# Patient Record
Sex: MALE | Race: OTHER | URBAN - METROPOLITAN AREA
[De-identification: names, ages, dates, MRNs, and addresses within clinical notes are randomized per-mention and may not be internally consistent; named-entity substitution may affect disease eponyms.]

---

## 2017-09-29 ENCOUNTER — OFFICE VISIT (OUTPATIENT)
Dept: SLEEP MEDICINE | Age: 52
End: 2017-09-29

## 2017-09-29 VITALS
HEART RATE: 55 BPM | WEIGHT: 219.5 LBS | SYSTOLIC BLOOD PRESSURE: 150 MMHG | DIASTOLIC BLOOD PRESSURE: 83 MMHG | BODY MASS INDEX: 32.51 KG/M2 | OXYGEN SATURATION: 99 % | HEIGHT: 69 IN

## 2017-09-29 DIAGNOSIS — G47.33 OSA (OBSTRUCTIVE SLEEP APNEA): Primary | ICD-10-CM

## 2017-09-29 DIAGNOSIS — I10 ESSENTIAL HYPERTENSION: ICD-10-CM

## 2017-09-29 RX ORDER — FLUOXETINE 20 MG/1
20 TABLET ORAL DAILY
COMMUNITY

## 2017-09-29 RX ORDER — ATENOLOL AND CHLORTHALIDONE TABLET 100; 25 MG/1; MG/1
1 TABLET ORAL DAILY
COMMUNITY

## 2017-09-29 NOTE — PROGRESS NOTES
[address] [place]. [place], [address]  Tel.  [phone]  Fax. 100 [facility] 60  [place], [address] [facility]  Tel.  [phone]  Fax. [phone] [address]   Tel.  [phone]  Fax. [phone]         Subjective:      Anat Mcnulty is an 45 y.o. male referred for evaluation for a sleep disorder. He complains of snoring associated with snorting, periods of not breathing. Symptoms began 3 years ago, gradually worsening since that time. He usually can fall asleep in 5 minutes. Family or house members note snoring, periods of not breathing. He denies completely or partially paralyzed while falling asleep or waking up. Anat Mcnulty does wake up frequently at night. He is bothered by waking up too early and left unable to get back to sleep. He actually sleeps about 8 hours at night and wakes up about 3 times during the night. He does not work shifts:  .   Fidelia Otto indicates he   get too little sleep at night. His bedtime is 2200. He awakens at 0700. He does not take naps. He has the following observed behaviors: Loud snoring, Light snoring, Pauses in breathing, Sleep talking, Twitching of legs or feet;  . Other remarks: waking with a gasp or snort    [place] Sleepiness Score: 3     Not on File      Current Outpatient Prescriptions:     atenolol-chlorthalidone (TENORETIC) 100-25 mg per tablet, Take 1 Tab by mouth daily. , Disp: , Rfl:     FLUoxetine (PROZAC) 20 mg tablet, Take 20 mg by mouth daily. , Disp: , Rfl:      He  has a past medical history of Hypertension and Psychiatric disorder. He  has no past surgical history on file. He family history includes Cancer in his father and mother; Hypertension in his father and mother. He  reports that he has never smoked. He has never used smokeless tobacco. He reports that he drinks alcohol.      Review of Systems:  Constitutional:  No significant weight loss or weight gain  Eyes:  No blurred vision  CVS:  No significant chest pain  Pulm:  No significant shortness of breath  GI:  No significant nausea or vomiting  :  No significant nocturia  Musculoskeletal:  No significant joint pain at night  Skin:  No significant rashes  Neuro:  No significant dizziness   Psych:  No active mood issues    Sleep Review of Systems: notable for no difficulty falling asleep; frequent awakenings at night;  regular dreaming noted; nightmares ; no early morning headaches; no memory problems; no concentration issues; no history of any automobile or occupational accidents due to daytime drowsiness. Objective:     Visit Vitals    /83    Pulse (!) 55    Ht 5' 9\" (1.753 m)    Wt 219 lb 8 oz (99.6 kg)    SpO2 99%    BMI 32.41 kg/m2         General:   Not in acute distress   Eyes:  Anicteric sclerae, no obvious strabismus   Nose:  No obvious nasal septum deviation    Oropharynx:   Class 4 oropharyngeal outlet, thick tongue base, uvula could not be seen due to low-lying soft palate, narrow tonsilo-pharyngeal pilars   Tonsils:   tonsils are not seen due to low-lying soft palate   Neck:   Neck circ. in \"inches\": 16.5; midline trachea   Chest/Lungs:  Equal lung expansion, clear on auscultation    CVS:  Normal rate, regular rhythm; no JVD   Skin:  Warm to touch; no obvious rashes   Neuro:  No focal deficits ; no obvious tremor    Psych:  Normal affect,  normal countenance;          Assessment:       ICD-10-CM ICD-9-CM    1. CIERA (obstructive sleep apnea) G47.33 327.23 SLEEP STUDY UNATTENDED, 4 CHANNEL   2. Essential hypertension I10 401.9    3. BMI 32.0-32.9,adult Z68.32 V85.32          Plan:     * The patient currently has a Moderate Risk for having sleep apnea. STOP-BANG score 5.  * Sleep testing was ordered for initial evaluation. * He was provided information on sleep apnea including coresponding risk factors and the importance of proper treatment. * Treatment options if indicated were reviewed today.  Patient agrees to a trial of OAT (Juan Montiel, LEE) therapy if indicated. * Counseling was provided regarding proper sleep hygiene (including effect of light on sleep), paradoxical intention, stimulus control, sleep environment safety and safe driving. * Effect of sleep disturbance on weight was reviewed. We have recommended a dedicated weight loss through appropriate diet and an exercise regiment as significant weight reduction has been shown to reduce severity of obstructive sleep apnea. * Patient agrees to telephone follow-up by myself or lead sleep technologist shortly after sleep study to review results and plan final management.     (patient has given permission for a message to be left regarding test results and further management if patient cannot be cannot be reached directly). Thank you for allowing us to participate in your patient's medical care. We'll keep you updated on these investigations. Karlene Conway MD, FAASM  Electronically signed.  09/29/17

## 2017-09-29 NOTE — PATIENT INSTRUCTIONS
[address] [place]. [place], [address]  Tel.  [phone]  Fax. 100 [facility] 60  [place], [address] [facility]  Tel.  [phone]  Fax. [phone] [address]  Tel.  [phone]  Fax. [phone]     Sleep Apnea: After Your Visit  Your Care Instructions  Sleep apnea occurs when you frequently stop breathing for 10 seconds or longer during sleep. It can be mild to severe, based on the number of times per hour that you stop breathing or have slowed breathing. Blocked or narrowed airways in your nose, mouth, or throat can cause sleep apnea. Your airway can become blocked when your throat muscles and tongue relax during sleep. Sleep apnea is common, occurring in 1 out of 20 individuals. Individuals having any of the following characteristics should be evaluated and treated right away due to high risk and detrimental consequences from untreated sleep apnea:  1. Obesity  2. Congestive Heart failure  3. Atrial Fibrillation  4. Uncontrolled Hypertension  5. Type II Diabetes  6. Night-time Arrhythmias  7. Stroke  8. Pulmonary Hypertension  9. High-risk Driving Populations (pilots, truck drivers, etc.)  10. Patients Considering Weight-loss Surgery    How do you know you have sleep apnea? You probably have sleep apnea if you answer 'yes' to 3 or more of the following questions:  S - Have you been told that you Snore? T - Are you often Tired during the day? O - Has anyone Observed you stop breathing while sleeping? P- Do you have (or are being treated for) high blood Pressure? B - Are you obese (Body Mass Index > 35)? A - Is your Age 48years old or older? N - Is your Neck size greater than 16 inches? G - Are you male Gender? A sleep physician can prescribe a breathing device that prevents tissues in the throat from blocking your airway.  Or your doctor may recommend using a dental device (oral breathing device) to help keep your airway open. In some cases, surgery may be needed to remove enlarged tissues in the throat. Follow-up care is a key part of your treatment and safety. Be sure to make and go to all appointments, and call your doctor if you are having problems. It's also a good idea to know your test results and keep a list of the medicines you take. How can you care for yourself at home? · Lose weight, if needed. It may reduce the number of times you stop breathing or have slowed breathing. · Go to bed at the same time every night. · Sleep on your side. It may stop mild apnea. If you tend to roll onto your back, sew a pocket in the back of your pajama top. Put a tennis ball into the pocket, and stitch the pocket shut. This will help keep you from sleeping on your back. · Avoid alcohol and medicines such as sleeping pills and sedatives before bed. · Do not smoke. Smoking can make sleep apnea worse. If you need help quitting, talk to your doctor about stop-smoking programs and medicines. These can increase your chances of quitting for good. · Prop up the head of your bed 4 to 6 inches by putting bricks under the legs of the bed. · Treat breathing problems, such as a stuffy nose, caused by a cold or allergies. · Use a continuous positive airway pressure (CPAP) breathing machine if lifestyle changes do not help your apnea and your doctor recommends it. The machine keeps your airway from closing when you sleep. · If CPAP does not help you, ask your doctor whether you should try other breathing machines. A bilevel positive airway pressure machine has two types of air pressureâone for breathing in and one for breathing out. Another device raises or lowers air pressure as needed while you breathe. · If your nose feels dry or bleeds when using one of these machines, talk with your doctor about increasing moisture in the air. A humidifier may help.   · If your nose is runny or stuffy from using a breathing machine, talk with your doctor about using decongestants or a corticosteroid nasal spray. When should you call for help? Watch closely for changes in your health, and be sure to contact your doctor if:  · You still have sleep apnea even though you have made lifestyle changes. · You are thinking of trying a device such as CPAP. · You are having problems using a CPAP or similar machine. Where can you learn more? Go to [employer]. Enter M203 in the search box to learn more about \"Sleep Apnea: After Your Visit. \"   © 7014-6950 Healthwise, Incorporated. Care instructions adapted under license by [facility], [facility] [employer] (which disclaims liability or warranty for this information). This care instruction is for use with your licensed healthcare professional. If you have questions about a medical condition or this instruction, always ask your healthcare professional. [place] any warranty or liability for your use of this information. PROPER SLEEP HYGIENE    What to avoid  · Do not have drinks with caffeine, such as coffee or black tea, for 8 hours before bed. · Do not smoke or use other types of tobacco near bedtime. Nicotine is a stimulant and can keep you awake. · Avoid drinking alcohol late in the evening, because it can cause you to wake in the middle of the night. · Do not eat a big meal close to bedtime. If you are hungry, eat a light snack. · Do not drink a lot of water close to bedtime, because the need to urinate may wake you up during the night. · Do not read or watch TV in bed. Use the bed only for sleeping and sexual activity. What to try  · Go to bed at the same time every night, and wake up at the same time every morning. Do not take naps during the day. · Keep your bedroom quiet, dark, and cool. · Get regular exercise, but not within 3 to 4 hours of your bedtime. .  · Sleep on a comfortable pillow and mattress.   · If watching the clock makes you anxious, turn it facing away from you so you cannot see the time. · If you worry when you lie down, start a worry book. Well before bedtime, write down your worries, and then set the book and your concerns aside. · Try meditation or other relaxation techniques before you go to bed. · If you cannot fall asleep, get up and go to another room until you feel sleepy. Do something relaxing. Repeat your bedtime routine before you go to bed again. · Make your house quiet and calm about an hour before bedtime. Turn down the lights, turn off the TV, log off the computer, and turn down the volume on music. This can help you relax after a busy day. Drowsy Driving  The [address] [place] [address] Road Traffic Safety Administration cites drowsiness as a causing factor in more than 267,038 police reported crashes annually, resulting in 76,000 injuries and 1,500 deaths. Other surveys suggest 55% of people polled have driven while drowsy in the past year, 23% had fallen asleep but not crashed, 3% crashed, and 2% had and accident due to drowsy driving. Who is at risk? Young Drivers: One study of drowsy driving accidents states that 55% of the drivers were under 25 years. Of those, 75% were male. Shift Workers and Travelers: People who work overnight or travel across time zones frequently are at higher risk of experiencing Circadian Rhythm Disorders. They are trying to work and function when their body is programed to sleep. Sleep Deprived: Lack of sleep has a serious impact on your ability to pay attention or focus on a task. Consistently getting less than the average of 8 hours your body needs creates partial or cumulative sleep deprivation. Untreated Sleep Disorders: Sleep Apnea, Narcolepsy, R.L.S., and other sleep disorders (untreated) prevent a person from getting enough restful sleep. This leads to excessive daytime sleepiness and increases the risk for drowsy driving accidents by up to 7 times.   Medications / Alcohol: Even over the counter medications can cause drowsiness. Medications that impair a drivers attention should have a warning label. Alcohol naturally makes you sleepy and on its own can cause accidents. Combined with excessive drowsiness its effects are amplified. Signs of Drowsy Driving:   * You don't remember driving the last few miles   * You may drift out of your bettina   * You are unable to focus and your thoughts wander   * You may yawn more often than normal   * You have difficulty keeping your eyes open / nodding off   * Missing traffic signs, speeding, or tailgating  Prevention-   Good sleep hygiene, lifestyle and behavioral choices have the most impact on drowsy driving. There is no substitute for sleep and the average person requires 8 hours nightly. If you find yourself driving drowsy, stop and sleep. Consider the sleep hygiene tips provided during your visit as well. Medication Refill Policy: Refills for all medications require 1 week advance notice. Please have your pharmacy fax a refill request. We are unable to fax, or call in \"controled substance\" medications and you will need to pick these prescriptions up from our office. [employer] Activation    Thank you for requesting access to [employer]. Please follow the instructions below to securely access and download your online medical record. [employer] allows you to send messages to your doctor, view your test results, renew your prescriptions, schedule appointments, and more. How Do I Sign Up? 1. In your internet browser, go to [email or web address]. 2. Click on the First Time User? Click Here link in the Sign In box. You will see the New Member Sign Up page. 3. Enter your [employer] Access Code exactly as it appears below. You will not need to use this code after youve completed the sign-up process. If you do not sign up before the expiration date, you must request a new code.     [employer] Access Code: [email or web address]  Expires: 12/28/2017  9:19 AM (This is the date your [employer] access code will )    4. Enter the last four digits of your Social Security Number (xxxx) and Date of Birth (mm/dd/yyyy) as indicated and click Submit. You will be taken to the next sign-up page. 5. Create a [employer] ID. This will be your [employer] login ID and cannot be changed, so think of one that is secure and easy to remember. 6. Create a [employer] password. You can change your password at any time. 7. Enter your Password Reset Question and Answer. This can be used at a later time if you forget your password. 8. Enter your e-mail address. You will receive e-mail notification when new information is available in [address] 9. Click Sign Up. You can now view and download portions of your medical record. 10. Click the Download Summary menu link to download a portable copy of your medical information. Additional Information    If you have questions, please call [phone]. Remember, [employer] is NOT to be used for urgent needs. For medical emergencies, dial 911.

## 2017-09-29 NOTE — MR AVS SNAPSHOT
Visit Information Date & Time Provider Department Dept. Phone Encounter #  
 9/29/2017  9:20 AM Mars Cardenas MD [address] [address] [id number] Follow-up Instructions Return if symptoms worsen or fail to improve. Upcoming Health Maintenance Date Due Hepatitis C Screening 1965 DTaP/Tdap/Td series (1 - Tdap) 10/12/1985 FOBT Q 1 YEAR AGE 50-75 10/12/2014 INFLUENZA AGE 9 TO ADULT 8/1/2017 Allergies as of 9/29/2017  Review Complete On: 9/29/2017 By: Mars Cardenas MD  
 Not on File Current Immunizations  Never Reviewed No immunizations on file. Not reviewed this visit You Were Diagnosed With   
  
 Codes Comments CIERA (obstructive sleep apnea)    -  Primary ICD-10-CM: G47.33 
ICD-9-CM: 327.23 Essential hypertension     ICD-10-CM: I10 
ICD-9-CM: 401.9 BMI 32.0-32.9,adult     ICD-10-CM: U07.12 
ICD-9-CM: V85.32 Vitals BP Pulse Height(growth percentile) Weight(growth percentile) SpO2 BMI  
 150/83 (!) 55 5' 9\" (1.753 m) 219 lb 8 oz (99.6 kg) 99% 32.41 kg/m2 Smoking Status Never Smoker Vitals History BMI and BSA Data Body Mass Index Body Surface Area  
 32.41 kg/m 2 2.2 m 2 Your Updated Medication List  
  
   
This list is accurate as of: 9/29/17  9:40 AM.  Always use your most recent med list.  
  
  
  
  
 atenolol-chlorthalidone 100-25 mg per tablet Commonly known as:  Sherolyn Ohm Take 1 Tab by mouth daily. FLUoxetine 20 mg tablet Commonly known as:  PROzac Take 20 mg by mouth daily. We Performed the Following SLEEP STUDY UNATTENDED, 4 CHANNEL [id number] CPT(R)] Follow-up Instructions Return if symptoms worsen or fail to improve. Patient Instructions [address] [facility]. [address] [facility], [address] Tel.  [phone] Fax. 820 [facility] [address], [address] Tel.  [phone] Fax. [phone] [address] Tel.  [phone] Fax. [phone] Sleep Apnea: After Your Visit Your Care Instructions Sleep apnea occurs when you frequently stop breathing for 10 seconds or longer during sleep. It can be mild to severe, based on the number of times per hour that you stop breathing or have slowed breathing. Blocked or narrowed airways in your nose, mouth, or throat can cause sleep apnea. Your airway can become blocked when your throat muscles and tongue relax during sleep. Sleep apnea is common, occurring in 1 out of 20 individuals. Individuals having any of the following characteristics should be evaluated and treated right away due to high risk and detrimental consequences from untreated sleep apnea: 
1. Obesity 2. Congestive Heart failure 3. Atrial Fibrillation 4. Uncontrolled Hypertension 5. Type II Diabetes 6. Night-time Arrhythmias 7. Stroke 8. Pulmonary Hypertension 9. High-risk Driving Populations (pilots, truck drivers, etc.) 10. Patients Considering Weight-loss Surgery How do you know you have sleep apnea? You probably have sleep apnea if you answer 'yes' to 3 or more of the following questions: S - Have you been told that you Snore? T - Are you often Tired during the day? O - Has anyone Observed you stop breathing while sleeping? P- Do you have (or are being treated for) high blood Pressure? B - Are you obese (Body Mass Index > 35)? A - Is your Age 48years old or older? N - Is your Neck size greater than 16 inches? G - Are you male Gender? A sleep physician can prescribe a breathing device that prevents tissues in the throat from blocking your airway. Or your doctor may recommend using a dental device (oral breathing device) to help keep your airway open. In some cases, surgery may be needed to remove enlarged tissues in the throat. Follow-up care is a key part of your treatment and safety. Be sure to make and go to all appointments, and call your doctor if you are having problems. It's also a good idea to know your test results and keep a list of the medicines you take. How can you care for yourself at home? · Lose weight, if needed. It may reduce the number of times you stop breathing or have slowed breathing. · Go to bed at the same time every night. · Sleep on your side. It may stop mild apnea. If you tend to roll onto your back, sew a pocket in the back of your pajama top. Put a tennis ball into the pocket, and stitch the pocket shut. This will help keep you from sleeping on your back. · Avoid alcohol and medicines such as sleeping pills and sedatives before bed. · Do not smoke. Smoking can make sleep apnea worse. If you need help quitting, talk to your doctor about stop-smoking programs and medicines. These can increase your chances of quitting for good. · Prop up the head of your bed 4 to 6 inches by putting bricks under the legs of the bed. · Treat breathing problems, such as a stuffy nose, caused by a cold or allergies. · Use a continuous positive airway pressure (CPAP) breathing machine if lifestyle changes do not help your apnea and your doctor recommends it. The machine keeps your airway from closing when you sleep. · If CPAP does not help you, ask your doctor whether you should try other breathing machines. A bilevel positive airway pressure machine has two types of air pressureâone for breathing in and one for breathing out. Another device raises or lowers air pressure as needed while you breathe. · If your nose feels dry or bleeds when using one of these machines, talk with your doctor about increasing moisture in the air. A humidifier may help. · If your nose is runny or stuffy from using a breathing machine, talk with your doctor about using decongestants or a corticosteroid nasal spray. When should you call for help? Watch closely for changes in your health, and be sure to contact your doctor if: 
· You still have sleep apnea even though you have made lifestyle changes. · You are thinking of trying a device such as CPAP. · You are having problems using a CPAP or similar machine. Where can you learn more? Go to [employer]. Enter A768 in the search box to learn more about \"Sleep Apnea: After Your Visit. \"  
© 6898-2157 Healthwise, Incorporated. Care instructions adapted under license by [employer] (which disclaims liability or warranty for this information). This care instruction is for use with your licensed healthcare professional. If you have questions about a medical condition or this instruction, always ask your healthcare professional.  Chain any warranty or liability for your use of this information. PROPER SLEEP HYGIENE What to avoid · Do not have drinks with caffeine, such as coffee or black tea, for 8 hours before bed. · Do not smoke or use other types of tobacco near bedtime. Nicotine is a stimulant and can keep you awake. · Avoid drinking alcohol late in the evening, because it can cause you to wake in the middle of the night. · Do not eat a big meal close to bedtime. If you are hungry, eat a light snack. · Do not drink a lot of water close to bedtime, because the need to urinate may wake you up during the night. · Do not read or watch TV in bed. Use the bed only for sleeping and sexual activity. What to try · Go to bed at the same time every night, and wake up at the same time every morning. Do not take naps during the day. · Keep your bedroom quiet, dark, and cool. · Get regular exercise, but not within 3 to 4 hours of your bedtime. Jorge Albert · Sleep on a comfortable pillow and mattress. · If watching the clock makes you anxious, turn it facing away from you so you cannot see the time. · If you worry when you lie down, start a worry book. Well before bedtime, write down your worries, and then set the book and your concerns aside. · Try meditation or other relaxation techniques before you go to bed. · If you cannot fall asleep, get up and go to another room until you feel sleepy. Do something relaxing. Repeat your bedtime routine before you go to bed again. · Make your house quiet and calm about an hour before bedtime. Turn down the lights, turn off the TV, log off the computer, and turn down the volume on music. This can help you relax after a busy day. Drowsy Driving The [employer] cites drowsiness as a causing factor in more than 216,017 police reported crashes annually, resulting in 76,000 injuries and 1,500 deaths. Other surveys suggest 55% of people polled have driven while drowsy in the past year, 23% had fallen asleep but not crashed, 3% crashed, and 2% had and accident due to drowsy driving. Who is at risk? Young Drivers: One study of drowsy driving accidents states that 55% of the drivers were under 25 years. Of those, 75% were male. Shift Workers and Travelers: People who work overnight or travel across time zones frequently are at higher risk of experiencing Circadian Rhythm Disorders. They are trying to work and function when their body is programed to sleep. Sleep Deprived: Lack of sleep has a serious impact on your ability to pay attention or focus on a task. Consistently getting less than the average of 8 hours your body needs creates partial or cumulative sleep deprivation. Untreated Sleep Disorders: Sleep Apnea, Narcolepsy, R.L.S., and other sleep disorders (untreated) prevent a person from getting enough restful sleep. This leads to excessive daytime sleepiness and increases the risk for drowsy driving accidents by up to 7 times.  
Medications / Alcohol: Even over the counter medications can cause drowsiness. Medications that impair a drivers attention should have a warning label. Alcohol naturally makes you sleepy and on its own can cause accidents. Combined with excessive drowsiness its effects are amplified. Signs of Drowsy Driving: * You don't remember driving the last few miles * You may drift out of your bettina * You are unable to focus and your thoughts wander * You may yawn more often than normal 
 * You have difficulty keeping your eyes open / nodding off * Missing traffic signs, speeding, or tailgating Prevention-  
Good sleep hygiene, lifestyle and behavioral choices have the most impact on drowsy driving. There is no substitute for sleep and the average person requires 8 hours nightly. If you find yourself driving drowsy, stop and sleep. Consider the sleep hygiene tips provided during your visit as well. Medication Refill Policy: Refills for all medications require 1 week advance notice. Please have your pharmacy fax a refill request. We are unable to fax, or call in \"controled substance\" medications and you will need to pick these prescriptions up from our office. Hashdoc Activation Thank you for requesting access to Hashdoc. Please follow the instructions below to securely access and download your online medical record. Hashdoc allows you to send messages to your doctor, view your test results, renew your prescriptions, schedule appointments, and more. How Do I Sign Up? 1. In your internet browser, go to [email or web address]. 2. Click on the First Time User? Click Here link in the Sign In box. You will see the New Member Sign Up page. 3. Enter your Hashdoc Access Code exactly as it appears below. You will not need to use this code after youve completed the sign-up process. If you do not sign up before the expiration date, you must request a new code. Hashdoc Access Code: [email or web address] Expires: 2017  9:19 AM (This is the date your [facility] access code will ) 4. Enter the last four digits of your Social Security Number (xxxx) and Date of Birth (mm/dd/yyyy) as indicated and click Submit. You will be taken to the next sign-up page. 5. Create a [email or web address] ID. This will be your [employer] login ID and cannot be changed, so think of one that is secure and easy to remember. 6. Create a [employer] password. You can change your password at any time. 7. Enter your Password Reset Question and Answer. This can be used at a later time if you forget your password. 8. Enter your e-mail address. You will receive e-mail notification when new information is available in [address] 9. Click Sign Up. You can now view and download portions of your medical record. 10. Click the Download Summary menu link to download a portable copy of your medical information. Additional Information If you have questions, please call [phone]. Remember, [employer] is NOT to be used for urgent needs. For medical emergencies, dial 911. Introducing [facility] [place] [facility]! [employer] introduces [employer] patient portal. Now you can access parts of your medical record, email your doctor's office, and request medication refills online. 1. In your internet browser, go to [email or web address] 2. Click on the First Time User? Click Here link in the Sign In box. You will see the New Member Sign Up page. 3. Enter your [email or web address] Access Code exactly as it appears below. You will not need to use this code after youve completed the sign-up process. If you do not sign up before the expiration date, you must request a new code. · [employer] Access Code: [email or web address] Expires: 2017  9:19 AM 
 
4. Enter the last four digits of your Social Security Number (xxxx) and Date of Birth (mm/dd/yyyy) as indicated and click Submit. You will be taken to the next sign-up page. 5. Create a Geosign ID. This will be your [employer] login ID and cannot be changed, so think of one that is secure and easy to remember. 6. Create a [employer] password. You can change your password at any time. 7. Enter your Password Reset Question and Answer. This can be used at a later time if you forget your password. 8. Enter your e-mail address. You will receive e-mail notification when new information is available in [address] 9. Click Sign Up. You can now view and download portions of your medical record. 10. Click the Download Summary menu link to download a portable copy of your medical information. If you have questions, please visit the Frequently Asked Questions section of the [employer] website. Remember, [employer] is NOT to be used for urgent needs. For medical emergencies, dial 911. Now available from your iPhone and Android! Please provide this summary of care documentation to your next provider. If you have any questions after today's visit, please call [phone].

## 2017-10-02 ENCOUNTER — TELEPHONE (OUTPATIENT)
Dept: SLEEP MEDICINE | Age: 52
End: 2017-10-02

## 2017-10-02 NOTE — TELEPHONE ENCOUNTER
Patient called to say he has a cold and did not do Home Sleep Test.  He will preform the study once his cold is gone, and return the device to the office.

## 2017-10-18 ENCOUNTER — HOSPITAL ENCOUNTER (OUTPATIENT)
Dept: SLEEP MEDICINE | Age: 52
Discharge: HOME | End: 2017-10-18
Payer: COMMERCIAL

## 2017-10-18 PROCEDURE — 95806 SLEEP STUDY UNATT&RESP EFFT: CPT

## 2017-10-19 ENCOUNTER — TELEPHONE (OUTPATIENT)
Dept: SLEEP MEDICINE | Age: 52
End: 2017-10-19

## 2017-10-19 DIAGNOSIS — G47.33 OSA (OBSTRUCTIVE SLEEP APNEA): Primary | ICD-10-CM

## 2017-10-19 NOTE — TELEPHONE ENCOUNTER
Patient is to be contacted by lead sleep technologist regarding results of Sleep Testing which was indicative of an average AHI of 5.9 per hour with an SpO2 tiffanie of 85% and SpO2 of < 88% being 0.7 minutes. * Treatment options were offered at initial visit. Patient had elected to proceed with a trial of using an Oral Device (Mandibular Advancing Device, Tongue Retention Device, etc.) which has been shown to be effective treatment for obstructive sleep apnea. * We have referred the patient to Dentistry for oral appliance evaluation. Follow-up office visit and re-testing to be done in 3-4 months after initiation of oral appliance therapy to assess efficacy of therapy. Encounter Diagnosis   Name Primary?     CIERA (obstructive sleep apnea) Yes       Orders Placed This Encounter    REFERRAL TO DENTISTRY     Referral Priority:   Routine     Referral Type:   Consultation     Referral Reason:   Specialty Services Required     Referred to Provider:   Efraín Elmore DDS     Number of Visits Requested:   1

## 2017-10-19 NOTE — TELEPHONE ENCOUNTER
[facility]    Date of Study: 10/18/17    No data available

## 2017-10-23 ENCOUNTER — DOCUMENTATION ONLY (OUTPATIENT)
Dept: SLEEP MEDICINE | Age: 52
End: 2017-10-23

## 2017-10-24 NOTE — TELEPHONE ENCOUNTER
Reviewed sleep study results with patient. He expressed understanding and is willing to proceed with OAT.